# Patient Record
Sex: FEMALE | Race: WHITE | NOT HISPANIC OR LATINO | URBAN - METROPOLITAN AREA
[De-identification: names, ages, dates, MRNs, and addresses within clinical notes are randomized per-mention and may not be internally consistent; named-entity substitution may affect disease eponyms.]

---

## 2024-06-05 ENCOUNTER — OFFICE VISIT (OUTPATIENT)
Dept: ENDOCRINOLOGY | Facility: HOSPITAL | Age: 44
End: 2024-06-05
Payer: COMMERCIAL

## 2024-06-05 VITALS
WEIGHT: 232 LBS | BODY MASS INDEX: 34.36 KG/M2 | HEIGHT: 69 IN | DIASTOLIC BLOOD PRESSURE: 80 MMHG | HEART RATE: 70 BPM | SYSTOLIC BLOOD PRESSURE: 118 MMHG

## 2024-06-05 DIAGNOSIS — E89.0 POSTOPERATIVE HYPOTHYROIDISM: ICD-10-CM

## 2024-06-05 DIAGNOSIS — E04.1 LEFT THYROID NODULE: Primary | ICD-10-CM

## 2024-06-05 DIAGNOSIS — E04.9 GOITER: ICD-10-CM

## 2024-06-05 DIAGNOSIS — E89.0 S/P PARTIAL THYROIDECTOMY: ICD-10-CM

## 2024-06-05 PROCEDURE — 99214 OFFICE O/P EST MOD 30 MIN: CPT | Performed by: INTERNAL MEDICINE

## 2024-06-05 RX ORDER — LEVOTHYROXINE SODIUM 0.05 MG/1
50 TABLET ORAL DAILY
Qty: 90 TABLET | Refills: 3 | Status: SHIPPED | OUTPATIENT
Start: 2024-06-05

## 2024-06-05 RX ORDER — LEVOTHYROXINE SODIUM 0.03 MG/1
25 TABLET ORAL DAILY
COMMUNITY
Start: 2024-05-02 | End: 2024-06-05 | Stop reason: DRUGHIGH

## 2024-06-05 NOTE — PROGRESS NOTES
6/6/2024    Assessment & Plan      Diagnoses and all orders for this visit:    Left thyroid nodule  -     levothyroxine 50 mcg tablet; Take 1 tablet (50 mcg total) by mouth daily  -     T4, free; Future  -     TSH, 3rd generation; Future  -     T4, free; Future  -     TSH, 3rd generation; Future    Goiter  -     levothyroxine 50 mcg tablet; Take 1 tablet (50 mcg total) by mouth daily  -     T4, free; Future  -     TSH, 3rd generation; Future  -     T4, free; Future  -     TSH, 3rd generation; Future    S/P partial thyroidectomy  -     levothyroxine 50 mcg tablet; Take 1 tablet (50 mcg total) by mouth daily  -     T4, free; Future  -     TSH, 3rd generation; Future  -     T4, free; Future  -     TSH, 3rd generation; Future    Postoperative hypothyroidism    Other orders  -     Discontinue: levothyroxine 25 mcg tablet; Take 25 mcg by mouth daily        Assessment & Plan  1. Post-left thyroid lobectomy status of a left thyroid nodule.  The patient is currently on a regimen of thyroid hormone for mild subclinical hypothyroidism. Despite the lingering symptoms of hypothyroidism, they are showing signs of improvement. However, her TSH levels have shown a decrease compared to pre-surgery levels. In response to this, I have decided to increase her levothyroxine dosage to 50 mcg daily to potentially alleviate her symptomatology. A repeat TSH with free T4 test will be conducted in several months, and adjustments will be made accordingly. I have also emphasized the importance of regular exercise and dietary modifications to aid in weight loss.    Follow-up  The patient is scheduled for a follow-up visit in 6 months, which will include a preceding TSH and free T4 test.    CC: Hypothyroid, thyroid nodule follow-up    History of Present Illness    HPI: Erin Forrester is a 44-year-old female with a history of a left-sided thyroid nodule which did show Primghar class 2 pathology, but was quite large causing some compressive  symptoms and did end up having microcalcifications. She underwent left thyroid lobectomy in 11/2024 with the final pathology being benign. She is here for a follow-up visit.    The patient underwent a left thyroid lobectomy at [place], with subsequent follow-up appointments every 6 months. Blood work was conducted concurrently with the ordered blood work. The surgeon's assessment indicated her pre-surgery level within the normal range, albeit slightly underactive. She reported experiencing coldness and hair thinning during the winter months. A follow-up visit with the ENT specialist in 05/2024 led to a retest of her T3 and T4 levels, which yielded normal results. The ENT specialist examined the other nodule and deemed it too small for biopsy and recommended a follow-up with her endocrinologist.     Currently, she is on a regimen of levothyroxine 25 mcg, taking one tablet each morning. Despite this, she continues to experience tightness, which she attributes to her age. Her coldness has improved since starting thyroid medication. However, she has been experiencing taste sensation issues since 02/2024, which were not COVID-19-related. Her primary care physician recommended an ENT appointment. The symptoms initially presented as sweet, foul smell, and sour taste, which have since evolved into a sour taste. She has no history of reflux. She reports no issues with swallowing or food getting stuck in her throat. She experiences mild hair thinning during her menstrual cycle, naturally dry skin, and severe itching on her lower extremities. She has been applying cocoa butter and maintaining hydration. She has brittle nails. She has IBS with diarrhea. She is overweight and experiences shortness of breath when ascending stairs, hot flashes, and sweating. She recently had her annual physical and was advised to see a cardiologist for a colonoscopy. She denies experiencing tremors but reports frequent pins and needles sensation  in her feet and hands, even at rest. She attempts to sleep for 5 to 6 hours per night but wakes up frequently and feels tired during the day. She wakes up at night to urinate. She reports fluid accumulation in her legs. Her fatigue has not improved since starting thyroid medication. She reports constant anxiety. She denies double vision but reports intermittent floaters. Her menstrual cycles are irregular, now occurring every 27 days. She has discussed her heavy menstrual bleeding with her gynecologist, who recommended an IUD. She has not undergone a true hormonal panel.        Historical Information   Past Medical History:   Diagnosis Date    Eczema     IBS (irritable bowel syndrome)     Psoriasis     Scoliosis     Seasonal allergies      Past Surgical History:   Procedure Laterality Date    APPENDECTOMY       SECTION      SPINAL FUSION      thoracic to lumbar.     Social History   Social History     Substance and Sexual Activity   Alcohol Use Yes    Comment: Social drinking     Social History     Substance and Sexual Activity   Drug Use Never     Social History     Tobacco Use   Smoking Status Never    Passive exposure: Never   Smokeless Tobacco Never     Family History:   Family History   Problem Relation Age of Onset    Diabetes unspecified Mother         prediabetes    Hypertension Mother     Hyperlipidemia Mother     Heart attack Father     Arrhythmia Father         ventricular fibrillation, long QT syndrome    Arrhythmia Sister         ventricular arrythmia    Thyroid disease unspecified Paternal Grandmother     Thyroid disease unspecified Family         paternal great aunts and cousins    No Known Problems Son     No Known Problems Daughter        Meds/Allergies   Current Outpatient Medications   Medication Sig Dispense Refill    cetirizine (ZyrTEC) 10 mg tablet Take 10 mg by mouth daily      fluticasone (FLONASE) 50 mcg/act nasal spray 1 spray into each nostril daily      levothyroxine 50 mcg  "tablet Take 1 tablet (50 mcg total) by mouth daily 90 tablet 3    loratadine (CLARITIN) 10 mg tablet Take by mouth      multivitamin (THERAGRAN) TABS Take 1 tablet by mouth daily occasioanlly       No current facility-administered medications for this visit.     Allergies   Allergen Reactions    Sulfa Antibiotics Rash       Objective   Vitals: Blood pressure 118/80, pulse 70, height 5' 9\" (1.753 m), weight 105 kg (232 lb).  Invasive Devices       None                   Physical Exam  Physical exam normal with pertinent positives and negatives.    No lid lag, stare, proptosis or periorbital edema in the eyes.  Healed anterior neck scar noted. No palpable thyroid tissue on the left, palpable tissue on the right with no nodules palpable. No lymphadenopathy in the neck.  Lungs are clear.  Heart has a regular rate and rhythm. No murmurs detected.  No tremor or outstretched hands. Reflexes at the patellar area are normal.      The history was obtained from the review of the chart and from the patient.    Lab Results:      Blood work performed on 5/14/2024 showed a TSH of 2.28 with a free T4 of 1.24 and a free T3 of 2.6.    Lab Results   Component Value Date    CREATININE 0.56 (L) 02/21/2024    CREATININE 0.56 05/17/2023    BUN 6 02/21/2024    K 4.0 02/21/2024    CL 98 02/21/2024    CO2 25 02/21/2024     eGFR   Date Value Ref Range Status   02/21/2024 115 >59 mL/min/1.73 Final   05/17/2023 115 > OR = 60 mL/min/1.73m2 Final     Comment:     The eGFR is based on the CKD-EPI 2021 equation. To calculate   the new eGFR from a previous Creatinine or Cystatin C  result, go to https://www.kidney.org/professionals/  kdoqi/gfr%5Fcalculator           Lab Results   Component Value Date    ALT 14 02/21/2024    AST 18 02/21/2024    ALKPHOS 70 05/17/2023       Lab Results   Component Value Date    TSH 3.540 02/21/2024    FREET4 1.04 02/21/2024             Future Appointments   Date Time Provider Department Center   9/5/2024  2:00 PM " Priscila Cortez MD [facility]   12/10/2024  1:40 PM Priscila Cortez MD [facility]

## 2024-06-05 NOTE — PATIENT INSTRUCTIONS
Let's try increasing the levothyroxine to 50 mcg daily to see if that helps you feel.     Follow up in 6 months with blood work.     We'll recheck blood work in 3 months.

## 2024-06-06 PROBLEM — E89.0 POSTOPERATIVE HYPOTHYROIDISM: Status: ACTIVE | Noted: 2024-06-06

## 2024-09-05 DIAGNOSIS — Z83.3 FAMILY HISTORY OF DIABETES MELLITUS: Primary | ICD-10-CM

## 2024-09-12 LAB
ALBUMIN SERPL-MCNC: 4.1 G/DL (ref 3.6–5.1)
ALBUMIN/GLOB SERPL: 1.2 (CALC) (ref 1–2.5)
ALP SERPL-CCNC: 71 U/L (ref 31–125)
ALT SERPL-CCNC: 8 U/L (ref 6–29)
AST SERPL-CCNC: 12 U/L (ref 10–35)
BILIRUB SERPL-MCNC: 0.4 MG/DL (ref 0.2–1.2)
BUN SERPL-MCNC: 8 MG/DL (ref 7–25)
BUN/CREAT SERPL: NORMAL (CALC) (ref 6–22)
CALCIUM SERPL-MCNC: 9.1 MG/DL (ref 8.6–10.2)
CHLORIDE SERPL-SCNC: 101 MMOL/L (ref 98–110)
CO2 SERPL-SCNC: 29 MMOL/L (ref 20–32)
CREAT SERPL-MCNC: 0.61 MG/DL (ref 0.5–0.99)
GFR/BSA.PRED SERPLBLD CYS-BASED-ARV: 112 ML/MIN/1.73M2
GLOBULIN SER CALC-MCNC: 3.3 G/DL (CALC) (ref 1.9–3.7)
GLUCOSE SERPL-MCNC: 92 MG/DL (ref 65–99)
POTASSIUM SERPL-SCNC: 3.8 MMOL/L (ref 3.5–5.3)
PROT SERPL-MCNC: 7.4 G/DL (ref 6.1–8.1)
SODIUM SERPL-SCNC: 137 MMOL/L (ref 135–146)
T4 FREE SERPL-MCNC: 1.1 NG/DL (ref 0.8–1.8)
TSH SERPL-ACNC: 1.67 MIU/L

## 2024-12-05 ENCOUNTER — TELEPHONE (OUTPATIENT)
Age: 44
End: 2024-12-05

## 2024-12-05 NOTE — TELEPHONE ENCOUNTER
Patient asking if labs are ordered. Made aware yes, labs are ordered for thyroid. Patient verbalized understanding.

## 2024-12-06 ENCOUNTER — TELEPHONE (OUTPATIENT)
Age: 44
End: 2024-12-06

## 2024-12-06 NOTE — TELEPHONE ENCOUNTER
Patient called stating she is at [facility] and they do not have her lab orders. Faxed over to [phone]  No further action needed

## 2024-12-07 ENCOUNTER — RESULTS FOLLOW-UP (OUTPATIENT)
Dept: ENDOCRINOLOGY | Facility: HOSPITAL | Age: 44
End: 2024-12-07

## 2024-12-07 LAB
T4 FREE SERPL-MCNC: 1.1 NG/DL (ref 0.8–1.8)
TSH SERPL-ACNC: 2.31 MIU/L

## 2024-12-10 ENCOUNTER — OFFICE VISIT (OUTPATIENT)
Dept: ENDOCRINOLOGY | Facility: HOSPITAL | Age: 44
End: 2024-12-10
Payer: COMMERCIAL

## 2024-12-10 VITALS
HEART RATE: 81 BPM | WEIGHT: 239.4 LBS | SYSTOLIC BLOOD PRESSURE: 128 MMHG | BODY MASS INDEX: 35.46 KG/M2 | DIASTOLIC BLOOD PRESSURE: 82 MMHG | HEIGHT: 69 IN

## 2024-12-10 DIAGNOSIS — E89.0 S/P PARTIAL THYROIDECTOMY: ICD-10-CM

## 2024-12-10 DIAGNOSIS — E04.1 LEFT THYROID NODULE: ICD-10-CM

## 2024-12-10 DIAGNOSIS — E89.0 POSTOPERATIVE HYPOTHYROIDISM: Primary | ICD-10-CM

## 2024-12-10 DIAGNOSIS — E66.9 OBESITY (BMI 35.0-39.9 WITHOUT COMORBIDITY): ICD-10-CM

## 2024-12-10 PROCEDURE — 99215 OFFICE O/P EST HI 40 MIN: CPT | Performed by: INTERNAL MEDICINE

## 2024-12-10 NOTE — PATIENT INSTRUCTIONS
The thyroid blood work is normal.    Continue the same levothyroxine 50 mcg daily.     We can try wegovy 0.25 mg once a week for 4 weeks and then 0.5  mg once a week.     Next thyroid ultrasound around may 2026.    Follow up in 3-4 months with blood work.

## 2024-12-10 NOTE — PROGRESS NOTES
12/10/2024    Assessment & Plan      Diagnoses and all orders for this visit:    Postoperative hypothyroidism  -     T4, free; Future  -     Comprehensive metabolic panel; Future  -     CBC and differential; Future  -     TSH, 3rd generation; Future  -     T3, free; Future    S/P partial thyroidectomy  -     T4, free; Future  -     Comprehensive metabolic panel; Future  -     CBC and differential; Future  -     TSH, 3rd generation; Future  -     T3, free; Future    Obesity (BMI 35.0-39.9 without comorbidity)  -     Semaglutide-Weight Management (WEGOVY) 0.25 MG/0.5ML; Inject 0.5 mL (0.25 mg total) under the skin once a week for 4 doses  -     Semaglutide-Weight Management (WEGOVY) 0.5 MG/0.5ML; Inject 0.5 mL (0.5 mg total) under the skin once a week for 12 doses  -     T4, free; Future  -     Comprehensive metabolic panel; Future  -     CBC and differential; Future  -     TSH, 3rd generation; Future  -     T3, free; Future    Left thyroid nodule        Assessment & Plan  1. Hypothyroidism post partial thyroidectomy.  Most recent thyroid function studies are normal, although the TSH is a bit higher. Clinically, she is stable. She will continue the same levothyroxine 50 mcg daily.    2. Thyroid nodule.  She has a history of a left thyroid nodule that is now post-surgical removal and has a very small right-sided nodule 2 to 4 mm which was evaluated on ultrasound by her surgeon about a year ago who is no longer following her. Her next thyroid ultrasound should be about 2 years afterwards around May 2026.    3. Obesity.  She has been struggling with weight loss despite euthyroidism and is interested in a GLP-1 inhibitor. Side effects and complications were discussed. She is exercising regularly and already on a restricted diet with intermittent fasting, but her weight has continued to increase. She is going to try Wegovy 0.25 mg once a week for 4 weeks and then 0.5 mg once a week thereafter if approved by insurance  companies as her BMI is 35.     She is asked to follow up in 3 to 4 months with preceding CMP, CBC, TSH, free T4, and free T3.    I have spent a total time of 40 minutes in caring for this patient on the day of the visit/encounter including Diagnostic results, Prognosis, Risks and benefits of tx options, Instructions for management, Patient and family education, Importance of tx compliance, Risk factor reductions, Impressions, Counseling / Coordination of care, Documenting in the medical record, Reviewing / ordering tests, medicine, procedures  , and Obtaining or reviewing history  .      CC: Hypothyroid, thyroid nodule, obesity follow-up    History of Present Illness    HPI: Erin Michel is a 44-year-old female with a history of a left-sided thyroid nodule, post partial thyroidectomy, here for a follow-up visit.    She was found to have multiple thyroid nodules on thyroid ultrasound and underwent fine needle aspiration biopsy of a left-sided 5.2 cm solid nodule, which showed pathology that Moody class 2 was benign. She did have some post-biopsy pain and tenderness and was seen in the office for evaluation. She had been recommended to undergo total thyroidectomy due to the size of her thyroid nodule and microcalcifications in a smaller nodule in the right lobe of the thyroid. She had left thyroid lobectomy on 11/15/2023. The pathology of the left thyroid did note benign hyperplastic thyroid tissue with no evidence of thyroid cancer.  She last saw the ENT surgeon in May 2024 who did an ultrasound showing a few millimeters size right sided thyroid nodule.    She has a history of significant weight gain and fatigue and secondary causes were ruled out in April 2023.    She was previously prescribed levothyroxine 25 mcg daily by her surgeon, which was subsequently increased to 50 mcg daily. She reports an improvement in her symptoms, including a decrease in cold intolerance. However, she continues to  experience polyuria at night, necessitating frequent urination. She also reports fluid accumulation in her legs when lying down, which prompts her to urinate approximately 20 minutes after assuming a supine position. She experiences generalized hair thinning, which is a significant concern for her. She also reports persistent dry skin, emotional lability, decreased libido, and difficulty losing weight. She has expressed interest in weight loss programs such as [employer] or [employer], and is considering the use of injectable medications for weight loss. She notes that on days when she forgets to take her medication in the morning, she feels more energetic, but also experiences increased cold intolerance. She typically does not take the missed dose later in the day, but will take it the following day. She estimates that she misses approximately one week of medication over a three-month period. She reports no hot flashes or sweats, but does report brittle nails, palpitations, hand tremors, leg jerks, and paresthesia in her extremities. Her sleep is generally good, averaging six hours per night, although interrupted by episodes of nocturia. She reports anxiety, daily crying spells, and happiness. She has regular menstrual cycles, but they are heavy and painful, often confining her to her home for two days. She has been advised to consider birth control pills or an intrauterine device (IUD) for menstrual management, but she is hesitant due to her age and thyroid condition. She reports no difficulty swallowing.        Historical Information   Past Medical History:   Diagnosis Date    Eczema     IBS (irritable bowel syndrome)     Psoriasis     Scoliosis     Seasonal allergies      Past Surgical History:   Procedure Laterality Date    APPENDECTOMY       SECTION      SPINAL FUSION      thoracic to lumbar.     Social History   Social History     Substance and Sexual Activity   Alcohol Use Yes    Alcohol/week: 2.0  "standard drinks of alcohol    Types: 1 Glasses of wine, 1 Standard drinks or equivalent per week    Comment: Social drinking     Social History     Substance and Sexual Activity   Drug Use Never     Social History     Tobacco Use   Smoking Status Never    Passive exposure: Never   Smokeless Tobacco Never     Family History:   Family History   Problem Relation Age of Onset    Diabetes unspecified Mother         prediabetes    Hypertension Mother     Hyperlipidemia Mother     Heart attack Father     Arrhythmia Father         ventricular fibrillation, long QT syndrome    Arrhythmia Sister         ventricular arrythmia    Thyroid disease unspecified Paternal Grandmother     Thyroid disease unspecified Family         paternal great aunts and cousins    No Known Problems Son     No Known Problems Daughter        Meds/Allergies   Current Outpatient Medications   Medication Sig Dispense Refill    cetirizine (ZyrTEC) 10 mg tablet Take 10 mg by mouth daily      fluticasone (FLONASE) 50 mcg/act nasal spray 1 spray into each nostril daily      levothyroxine 50 mcg tablet Take 1 tablet (50 mcg total) by mouth daily 90 tablet 3    multivitamin (THERAGRAN) TABS Take 1 tablet by mouth daily occasioanlly      Semaglutide-Weight Management (WEGOVY) 0.25 MG/0.5ML Inject 0.5 mL (0.25 mg total) under the skin once a week for 4 doses 2 mL 0    Semaglutide-Weight Management (WEGOVY) 0.5 MG/0.5ML Inject 0.5 mL (0.5 mg total) under the skin once a week for 12 doses 2 mL 2     No current facility-administered medications for this visit.     Allergies   Allergen Reactions    Sulfa Antibiotics Rash       Objective   Vitals: Blood pressure 128/82, pulse 81, height 5' 9\" (1.753 m), weight 109 kg (239 lb 6.4 oz).  Invasive Devices       None                   Physical Exam    No lid lag, stare, proptosis, or periorbital edema. Healed anterior neck scar. No palpable thyroid tissue on the left. Right side palpable. No lymphadenopathy.  Lungs are " clear to auscultation.  Heart has a regular rate and rhythm. No murmurs.  No tremor of the outstretched hands. Patellar deep tendon reflexes are normal.      The history was obtained from the review of the chart and from the patient.    Lab Results:          Lab Results   Component Value Date    CREATININE 0.61 09/11/2024    CREATININE 0.56 (L) 02/21/2024    CREATININE 0.56 05/17/2023    BUN 8 09/11/2024    K 3.8 09/11/2024     09/11/2024    CO2 29 09/11/2024     eGFR   Date Value Ref Range Status   09/11/2024 112 > OR = 60 mL/min/1.73m2 Final         Lab Results   Component Value Date    ALT 8 09/11/2024    AST 12 09/11/2024    ALKPHOS 71 09/11/2024       Lab Results   Component Value Date    TSH 2.31 12/06/2024    FREET4 1.1 12/06/2024             Future Appointments   Date Time Provider Department Center   5/14/2025  2:20 PM Priscila Cortez MD [facility]

## 2025-03-25 DIAGNOSIS — E66.9 OBESITY (BMI 35.0-39.9 WITHOUT COMORBIDITY): Primary | ICD-10-CM

## 2025-03-26 ENCOUNTER — TELEPHONE (OUTPATIENT)
Age: 45
End: 2025-03-26

## 2025-03-26 NOTE — TELEPHONE ENCOUNTER
The pt called to ask if her last name can be changed on her chart with her correct  name to match her insurance cards (which was done and also she will scan in her drivers license to match as well on mychart)    She also mentioned that per Brianna she needs a prior auth and to enroll in [facility] weight loss classes which she did - she is checking with insurance if the Wegovy should be sent to [facility] or [facility] mail order. She will be calling back with that info and if we can start the prior auth for her

## 2025-05-10 ENCOUNTER — RESULTS FOLLOW-UP (OUTPATIENT)
Dept: ENDOCRINOLOGY | Facility: HOSPITAL | Age: 45
End: 2025-05-10

## 2025-05-10 LAB
ALBUMIN SERPL-MCNC: 4.3 G/DL (ref 3.6–5.1)
ALBUMIN/GLOB SERPL: 1.5 (CALC) (ref 1–2.5)
ALP SERPL-CCNC: 57 U/L (ref 31–125)
ALT SERPL-CCNC: 7 U/L (ref 6–29)
AST SERPL-CCNC: 10 U/L (ref 10–35)
BASOPHILS # BLD AUTO: 33 CELLS/UL (ref 0–200)
BASOPHILS NFR BLD AUTO: 0.5 %
BILIRUB SERPL-MCNC: 0.4 MG/DL (ref 0.2–1.2)
BUN SERPL-MCNC: 9 MG/DL (ref 7–25)
BUN/CREAT SERPL: NORMAL (CALC) (ref 6–22)
CALCIUM SERPL-MCNC: 8.7 MG/DL (ref 8.6–10.2)
CHLORIDE SERPL-SCNC: 102 MMOL/L (ref 98–110)
CO2 SERPL-SCNC: 27 MMOL/L (ref 20–32)
CREAT SERPL-MCNC: 0.59 MG/DL (ref 0.5–0.99)
EOSINOPHIL # BLD AUTO: 78 CELLS/UL (ref 15–500)
EOSINOPHIL NFR BLD AUTO: 1.2 %
ERYTHROCYTE [DISTWIDTH] IN BLOOD BY AUTOMATED COUNT: 13 % (ref 11–15)
GFR/BSA.PRED SERPLBLD CYS-BASED-ARV: 112 ML/MIN/1.73M2
GLOBULIN SER CALC-MCNC: 2.9 G/DL (CALC) (ref 1.9–3.7)
GLUCOSE SERPL-MCNC: 82 MG/DL (ref 65–99)
HCT VFR BLD AUTO: 35.4 % (ref 35–45)
HGB BLD-MCNC: 11.8 G/DL (ref 11.7–15.5)
LYMPHOCYTES # BLD AUTO: 2210 CELLS/UL (ref 850–3900)
LYMPHOCYTES NFR BLD AUTO: 34 %
MCH RBC QN AUTO: 29.1 PG (ref 27–33)
MCHC RBC AUTO-ENTMCNC: 33.3 G/DL (ref 32–36)
MCV RBC AUTO: 87.2 FL (ref 80–100)
MONOCYTES # BLD AUTO: 566 CELLS/UL (ref 200–950)
MONOCYTES NFR BLD AUTO: 8.7 %
NEUTROPHILS # BLD AUTO: 3614 CELLS/UL (ref 1500–7800)
NEUTROPHILS NFR BLD AUTO: 55.6 %
PLATELET # BLD AUTO: 296 THOUSAND/UL (ref 140–400)
PMV BLD REES-ECKER: 10.5 FL (ref 7.5–12.5)
POTASSIUM SERPL-SCNC: 3.8 MMOL/L (ref 3.5–5.3)
PROT SERPL-MCNC: 7.2 G/DL (ref 6.1–8.1)
RBC # BLD AUTO: 4.06 MILLION/UL (ref 3.8–5.1)
SODIUM SERPL-SCNC: 137 MMOL/L (ref 135–146)
T3FREE SERPL-MCNC: 3.2 PG/ML (ref 2.3–4.2)
T4 FREE SERPL-MCNC: 1.2 NG/DL (ref 0.8–1.8)
TSH SERPL-ACNC: 2.38 MIU/L
WBC # BLD AUTO: 6.5 THOUSAND/UL (ref 3.8–10.8)

## 2025-05-14 ENCOUNTER — OFFICE VISIT (OUTPATIENT)
Dept: ENDOCRINOLOGY | Facility: HOSPITAL | Age: 45
End: 2025-05-14
Payer: COMMERCIAL

## 2025-05-14 VITALS
DIASTOLIC BLOOD PRESSURE: 68 MMHG | WEIGHT: 238.6 LBS | BODY MASS INDEX: 35.24 KG/M2 | HEART RATE: 84 BPM | SYSTOLIC BLOOD PRESSURE: 130 MMHG | OXYGEN SATURATION: 100 %

## 2025-05-14 DIAGNOSIS — R53.82 CHRONIC FATIGUE: ICD-10-CM

## 2025-05-14 DIAGNOSIS — E04.1 LEFT THYROID NODULE: ICD-10-CM

## 2025-05-14 DIAGNOSIS — E04.9 GOITER: ICD-10-CM

## 2025-05-14 DIAGNOSIS — E89.0 S/P PARTIAL THYROIDECTOMY: ICD-10-CM

## 2025-05-14 DIAGNOSIS — D64.9 ANEMIA, UNSPECIFIED TYPE: ICD-10-CM

## 2025-05-14 DIAGNOSIS — E89.0 POSTOPERATIVE HYPOTHYROIDISM: Primary | ICD-10-CM

## 2025-05-14 DIAGNOSIS — E66.9 OBESITY (BMI 35.0-39.9 WITHOUT COMORBIDITY): ICD-10-CM

## 2025-05-14 PROCEDURE — 99214 OFFICE O/P EST MOD 30 MIN: CPT | Performed by: INTERNAL MEDICINE

## 2025-05-14 RX ORDER — LEVOCETIRIZINE DIHYDROCHLORIDE 5 MG/1
5 TABLET, FILM COATED ORAL EVERY EVENING
COMMUNITY

## 2025-05-14 RX ORDER — ECONAZOLE NITRATE 10 MG/G
CREAM TOPICAL
COMMUNITY

## 2025-05-14 RX ORDER — TRIAMCINOLONE ACETONIDE 1 MG/G
OINTMENT TOPICAL 2 TIMES DAILY
COMMUNITY

## 2025-05-14 RX ORDER — LEVOTHYROXINE SODIUM 50 UG/1
50 TABLET ORAL DAILY
Qty: 90 TABLET | Refills: 3 | Status: SHIPPED | OUTPATIENT
Start: 2025-05-14

## 2025-05-14 NOTE — ASSESSMENT & PLAN NOTE
Orders:    Comprehensive metabolic panel; Future    T4, free; Future    T3, free; Future    TSH, 3rd generation; Future    CBC and differential; Future

## 2025-05-14 NOTE — ASSESSMENT & PLAN NOTE
Orders:    Vitamin D 25 hydroxy    Ferritin    Comprehensive metabolic panel; Future    T4, free; Future    T3, free; Future    TSH, 3rd generation; Future    CBC and differential; Future

## 2025-05-14 NOTE — PATIENT INSTRUCTIONS
The thyroid blood work is normal.     Continue the same levothyroxine.    Next ultrasound we can do next spring 2026.     Continue the wegovy 0.5 mg once a week. IF weight loss stalls after 1 month, we can increase to 1 mg weekly.     Continue the same diet and regular exercise.     We'll check iron and vitamin D levels due to the fatigue and hair loss.     Follow up in 6 months with blood work.

## 2025-05-14 NOTE — ASSESSMENT & PLAN NOTE
Orders:    levothyroxine 50 mcg tablet; Take 1 tablet (50 mcg total) by mouth daily    Comprehensive metabolic panel; Future    T4, free; Future    T3, free; Future    TSH, 3rd generation; Future    CBC and differential; Future

## 2025-05-14 NOTE — PROGRESS NOTES
Name: Erin Michel      : 1980      MRN: [id number]  Encounter Provider: Priscila Cortez MD  Encounter Date: 2025   Encounter department: [facility]    No chief complaint on file.  :  Assessment & Plan  Postoperative hypothyroidism    Orders:    Comprehensive metabolic panel; Future    T4, free; Future    T3, free; Future    TSH, 3rd generation; Future    CBC and differential; Future    Left thyroid nodule    Orders:    levothyroxine 50 mcg tablet; Take 1 tablet (50 mcg total) by mouth daily    Comprehensive metabolic panel; Future    T4, free; Future    T3, free; Future    TSH, 3rd generation; Future    CBC and differential; Future    Obesity (BMI 35.0-39.9 without comorbidity)    Orders:    Comprehensive metabolic panel; Future    T4, free; Future    T3, free; Future    TSH, 3rd generation; Future    CBC and differential; Future    Goiter    Orders:    levothyroxine 50 mcg tablet; Take 1 tablet (50 mcg total) by mouth daily    Comprehensive metabolic panel; Future    T4, free; Future    T3, free; Future    TSH, 3rd generation; Future    CBC and differential; Future    S/P partial thyroidectomy    Orders:    levothyroxine 50 mcg tablet; Take 1 tablet (50 mcg total) by mouth daily    Comprehensive metabolic panel; Future    T4, free; Future    T3, free; Future    TSH, 3rd generation; Future    CBC and differential; Future    Chronic fatigue    Orders:    Vitamin D 25 hydroxy    Ferritin    Comprehensive metabolic panel; Future    T4, free; Future    T3, free; Future    TSH, 3rd generation; Future    CBC and differential; Future    Anemia, unspecified type    Orders:    Vitamin D 25 hydroxy    Ferritin    Comprehensive metabolic panel; Future    T4, free; Future    T3, free; Future    TSH, 3rd generation; Future    CBC and differential; Future      Assessment & Plan  1.  Hypothyroidism post thyroidectomy:  - Thyroid function tests are within normal  limits.  - Most recent ultrasound revealed a stable 2.4 cm right-sided thyroid nodule.  - Follow-up ultrasound scheduled for spring 2026.  - Prescription refill for levothyroxine 50 mcg daily provided.    2. Obesity:  - Persistent weight gain despite dietary restrictions and regular exercise.  - Wegovy 0.5 mg once weekly started in 03/2025, with a total weight loss of 5 pounds since then.  - Advised to continue current diet and regular exercise routine.  - Dosage of Wegovy may be increased to 1 mg weekly if weight loss plateaus after one month.    3. Fatigue and hair loss:  - Indices at the lower end of the normal range, hemoglobin level is 11.8 (normal is 11.7).  - Calcium levels are low normal; vitamin D deficiency can lower calcium and contribute to hair loss and fatigue.  - Iron and vitamin D levels will be assessed due to reported fatigue and hair loss.  - Multivitamin intake reviewed, potential iron deficiency discussed.      Follow-up: Scheduled for a follow-up visit in 6 months with preceding CMP, CBC, TSH, free T4, and free T3.    She will get a 25-hydroxy vitamin D level and ferritin level performed at her earliest convenience.      History of Present Illness   History of Present Illness  Erin Michel is a 45-year-old female with a history of a left-sided thyroid nodule post partial thyroidectomy and obesity, presenting for a follow-up visit.    She was found to have multiple thyroid nodules on thyroid ultrasound and underwent fine-needle aspiration biopsy of a left-sided 5.2 cm solid thyroid nodule, which showed pathology that was [place] class II or benign. She experienced some post-biopsy pain and tenderness. Due to the size of her thyroid nodule, she underwent a total thyroidectomy, especially with microcalcifications and a smaller nodule in the right lobe of the thyroid. She had a left thyroid lobectomy in 11/2023. The pathology of the left lobe showed benign hyperplastic thyroid tissue with  no evidence of thyroid cancer. Her last visit with her ENT surgeon was in 05/2024, during which an ultrasound revealed a right-sided thyroid nodule that was a few millimeters in size.     She reports no difficulty swallowing or food impaction. She occasionally experiences eye twitching but no hand tremors. She has not undergone FNA for the right-sided thyroid nodule as it is too small. She is currently on levothyroxine 50 mcg daily for her thyroid condition.    She has obesity, and secondary causes have been ruled out. Despite more than a year of a calorie-restricted diet and regular exercise, she continues to experience weight gain and fatigue. She reports no appetite suppression but admits to stress eating, particularly during the second half of her menstrual cycle. She has initiated a regimen of vitamins and hydration. She is enrolled in the [employer] program, which emphasizes healthy eating and portion control. Her diet includes eggs and sprouted bread in the morning. She reports no significant nausea or abdominal pain associated with Wegovy. She began self-financed Wegovy 0.25 at the end of 03/2025, experienced sickness, and then started the preapproved 0.5 dose last week. Since the end of 03/2025, she has lost approximately 5 pounds.    She reports frequent urination at night, necessitating 4 to 5 bathroom visits. She has an upcoming appointment with a urogynecologist and has undergone a pelvic MRI, which yielded normal results.    She experiences constant coldness in her hands and feet, hair shedding, and heavy menstrual periods. She also reports loose stools and constipation around her menstrual period, dry skin, brittle nails, and sporadic sleep. She often feels fatigued during the day. She experiences shortness of breath when climbing stairs or carrying laundry, requiring her to rest and catch her breath. She is taking vitamin D through her multivitamin.      Pertinent Medical History   Erin Michel is  a 45-year-old female with a history of a left-sided thyroid nodule post partial thyroidectomy and obesity, presenting for a follow-up visit.    She was found to have multiple thyroid nodules on thyroid ultrasound and underwent fine-needle aspiration biopsy of a left-sided 5.2 cm solid thyroid nodule, which showed pathology that was [employer] class II or benign. She experienced some post-biopsy pain and tenderness. Due to the size of her thyroid nodule, she underwent a total thyroidectomy, especially with microcalcifications and a smaller nodule in the right lobe of the thyroid. She had a left thyroid lobectomy in 11/2023. The pathology of the left lobe showed benign hyperplastic thyroid tissue with no evidence of thyroid cancer. Her last visit with her ENT surgeon was in 05/2024, during which an ultrasound revealed a right-sided thyroid nodule that was a few millimeters in size.    She has obesity, and secondary causes have been ruled out. Despite more than a year of a calorie-restricted diet and regular exercise, she continues to experience weight gain and fatigue.  She has been utilizing a GLP-1 inhibitor.        Review of Systems as per HPI  Medical History Reviewed by provider this encounter:  Tobacco  Allergies  Meds  Problems  Med Hx  Surg Hx  Fam Hx     .  Medications Ordered Prior to Encounter[1]   Social History     Tobacco Use    Smoking status: Never     Passive exposure: Never    Smokeless tobacco: Never   Vaping Use    Vaping status: Never Used   Substance and Sexual Activity    Alcohol use: Yes     Alcohol/week: 2.0 standard drinks of alcohol     Types: 1 Glasses of wine, 1 Standard drinks or equivalent per week     Comment: Social drinking    Drug use: Never    Sexual activity: Yes     Partners: Male     Birth control/protection: Condom Male        Medical History Reviewed by provider this encounter:  Tobacco  Allergies  Meds  Problems  Med Hx  Surg Hx  Fam Hx     .    Objective   BP  130/68   Pulse 84   Wt 108 kg (238 lb 9.6 oz)   SpO2 100%   BMI 35.24 kg/m²      Body mass index is 35.24 kg/m².  Wt Readings from Last 3 Encounters:   05/14/25 108 kg (238 lb 9.6 oz)   12/10/24 109 kg (239 lb 6.4 oz)   09/05/24 105 kg (232 lb 6.4 oz)     Physical Exam  Physical Exam  Head and Neck: No lid lag, stare, proptosis, or periorbital edema. Healed anterior neck scar. Palpable thyroid tissue on the left. No enlarged nodules or lymph nodes.  Cardiovascular: Heart has a regular rate and rhythm. No murmurs detected.  Respiratory: Lungs are clear to auscultation.  Neurological: No tremor of the outstretched hands. Patellar deep tendon reflexes are normal.    Results    Labs:     Blood work performed on 5/9/2025 showed a TSH of 2.38 with a free T4 of 1.2 and a free T3 of 3.2.    CBC is normal.    CMP showed glucose of 72 fasting but was otherwise normal.    Lab Results   Component Value Date    HGBA1C 5.1 05/17/2023     Lab Results   Component Value Date    CREATININE 0.59 05/09/2025    CREATININE 0.61 09/11/2024    CREATININE 0.56 (L) 02/21/2024    BUN 9 05/09/2025    K 3.8 05/09/2025     05/09/2025    CO2 27 05/09/2025     eGFR   Date Value Ref Range Status   05/09/2025 112 > OR = 60 mL/min/1.73m2 Final       Lab Results   Component Value Date    ALT 7 05/09/2025    AST 10 05/09/2025    ALKPHOS 57 05/09/2025     Lab Results   Component Value Date    FREET4 1.2 05/09/2025       Patient Instructions   The thyroid blood work is normal.     Continue the same levothyroxine.    Next ultrasound we can do next spring 2026.     Continue the wegovy 0.5 mg once a week. IF weight loss stalls after 1 month, we can increase to 1 mg weekly.     Continue the same diet and regular exercise.     We'll check iron and vitamin D levels due to the fatigue and hair loss.     Follow up in 6 months with blood work.     Discussed with the patient and all questioned fully answered. She will call me if any problems arise.            [1]   Current Outpatient Medications on File Prior to Visit   Medication Sig Dispense Refill    cetirizine (ZyrTEC) 10 mg tablet Take 10 mg by mouth in the morning.      econazole nitrate 1 % cream APPLY TWICE DAILY TO RASH UNTIL CLEAR. THEN AS NEEDED FLARES.      fluticasone (FLONASE) 50 mcg/act nasal spray 1 spray into each nostril in the morning.      multivitamin (THERAGRAN) TABS Take 1 tablet by mouth in the morning. occasioanlly.      Semaglutide-Weight Management (WEGOVY) 0.5 MG/0.5ML Inject 0.5 mL (0.5 mg total) under the skin once a week 2 mL 5    triamcinolone (KENALOG) 0.1 % ointment Apply topically 2 (two) times a day      [DISCONTINUED] levothyroxine 50 mcg tablet Take 1 tablet (50 mcg total) by mouth daily 90 tablet 3    levocetirizine (XYZAL) 5 MG tablet Take 5 mg by mouth every evening (Patient not taking: Reported on 5/14/2025)      Semaglutide-Weight Management (WEGOVY) 0.25 MG/0.5ML Inject 0.5 mL (0.25 mg total) under the skin once a week (Patient not taking: Reported on 5/14/2025) 2 mL 0     No current facility-administered medications on file prior to visit.

## 2025-05-29 ENCOUNTER — TELEPHONE (OUTPATIENT)
Age: 45
End: 2025-05-29

## 2025-05-29 DIAGNOSIS — E66.9 OBESITY (BMI 35.0-39.9 WITHOUT COMORBIDITY): Primary | ICD-10-CM

## 2025-05-29 NOTE — TELEPHONE ENCOUNTER
Patient called asking if the wegovy dose can be increased to the 1mg? She said a PA will likely need to be done again. Please advise.

## 2025-06-28 LAB
25(OH)D3 SERPL-MCNC: 35 NG/ML (ref 30–100)
FERRITIN SERPL-MCNC: 30 NG/ML (ref 16–232)